# Patient Record
Sex: FEMALE | Race: WHITE
[De-identification: names, ages, dates, MRNs, and addresses within clinical notes are randomized per-mention and may not be internally consistent; named-entity substitution may affect disease eponyms.]

---

## 2017-01-04 LAB
ANION GAP SERPL CALC-SCNC: 6 MMOL/L (ref 3–11)
APPEARANCE UR: CLEAR
BASOPHILS # BLD: 0.05 K/UL (ref 0–0.2)
BASOPHILS NFR BLD: 0.7 %
BILIRUB UR-MCNC: (no result) MG/DL
BUN SERPL-MCNC: 14 MG/DL (ref 7–18)
BUN/CREAT SERPL: 20.1 (ref 10–20)
CALCIUM SERPL-MCNC: 9.4 MG/DL (ref 8.5–10.1)
CHLORIDE SERPL-SCNC: 108 MMOL/L (ref 98–107)
CO2 SERPL-SCNC: 29 MMOL/L (ref 21–32)
COLOR UR: (no result)
COMPLETE: YES
CREAT CL PREDICTED SERPL C-G-VRATE: 76.4 ML/MIN
CREAT SERPL-MCNC: 0.71 MG/DL (ref 0.6–1.2)
EOSINOPHIL NFR BLD AUTO: 353 K/UL (ref 130–400)
GLUCOSE SERPL-MCNC: 102 MG/DL (ref 70–99)
HCT VFR BLD CALC: 42.2 % (ref 37–47)
IG%: 0.3 %
IMM GRANULOCYTES NFR BLD AUTO: 32.9 %
LYMPHOCYTES # BLD: 2.36 K/UL (ref 1.2–3.4)
MANUAL MICROSCOPIC REQUIRED?: NO
MCH RBC QN AUTO: 29.9 PG (ref 25–34)
MCHC RBC AUTO-ENTMCNC: 33.9 G/DL (ref 32–36)
MCV RBC AUTO: 88.3 FL (ref 80–100)
MONOCYTES NFR BLD: 8.9 %
NEUTROPHILS # BLD AUTO: 7.3 %
NEUTROPHILS NFR BLD AUTO: 49.9 %
NITRITE UR QL STRIP: (no result)
PH UR STRIP: 6.5 [PH] (ref 4.5–7.5)
PMV BLD AUTO: 11.1 FL (ref 7.4–10.4)
POTASSIUM SERPL-SCNC: 4.1 MMOL/L (ref 3.5–5.1)
RBC # BLD AUTO: 4.78 M/UL (ref 4.2–5.4)
REVIEW REQ?: NO
SODIUM SERPL-SCNC: 143 MMOL/L (ref 136–145)
SP GR UR STRIP: 1.02 (ref 1–1.03)
URINE BILL WITH OR WITHOUT MIC: (no result)
URINE EPITHELIAL CELL AUTO: >30 /LPF (ref 0–5)
UROBILINOGEN UR-MCNC: (no result) MG/DL
WBC # BLD AUTO: 7.17 K/UL (ref 4.8–10.8)

## 2017-01-04 NOTE — PAT MEDICATION INSTRUCTIONS
Service Date


Jan 4, 2017.





Current Home Medication List


Ibuprofen (Advil), 600 MG PO prn





Medication Instructions


For Your Scheduled Surgery 





- Check with surgeon for instructions: 


Ibuprofen (Advil), 600 MG PO prn





If you have any questions please call us at [phone] (Dorothy Jules PA-C

) or [phone] or [phone]

## 2017-01-04 NOTE — DIAGNOSTIC IMAGING REPORT
CHEST PREADMISSION(PA/LAT)



HISTORY:      Preop.



COMPARISON: Chest 5/29/2014.



FINDINGS: The lungs are clear. Cardiac silhouette is normal in size. No pleural

effusions. No pneumothorax. Cervical spinal fusion hardware. Prior

cholecystectomy.



IMPRESSION:

No acute process.







Electronically signed by:  Ivan Jett M.D.

1/4/2017 10:49 AM

## 2017-01-10 ENCOUNTER — HOSPITAL ENCOUNTER (OUTPATIENT)
Dept: HOSPITAL 45 - C.ACU | Age: 62
Discharge: HOME | End: 2017-01-10
Attending: UROLOGY
Payer: COMMERCIAL

## 2017-01-10 VITALS
WEIGHT: 158.73 LBS | HEIGHT: 62.01 IN | HEIGHT: 62.01 IN | WEIGHT: 158.73 LBS | BODY MASS INDEX: 28.84 KG/M2 | BODY MASS INDEX: 28.84 KG/M2

## 2017-01-10 VITALS
TEMPERATURE: 97.7 F | OXYGEN SATURATION: 97 % | HEART RATE: 66 BPM | SYSTOLIC BLOOD PRESSURE: 110 MMHG | DIASTOLIC BLOOD PRESSURE: 74 MMHG

## 2017-01-10 VITALS
TEMPERATURE: 97.7 F | OXYGEN SATURATION: 97 % | DIASTOLIC BLOOD PRESSURE: 84 MMHG | HEART RATE: 66 BPM | SYSTOLIC BLOOD PRESSURE: 134 MMHG

## 2017-01-10 VITALS
DIASTOLIC BLOOD PRESSURE: 82 MMHG | SYSTOLIC BLOOD PRESSURE: 136 MMHG | OXYGEN SATURATION: 96 % | TEMPERATURE: 97.52 F | HEART RATE: 73 BPM

## 2017-01-10 VITALS
DIASTOLIC BLOOD PRESSURE: 84 MMHG | SYSTOLIC BLOOD PRESSURE: 125 MMHG | OXYGEN SATURATION: 97 % | HEART RATE: 69 BPM | TEMPERATURE: 97.7 F

## 2017-01-10 DIAGNOSIS — N39.3: Primary | ICD-10-CM

## 2017-01-10 DIAGNOSIS — K85.90: ICD-10-CM

## 2017-01-10 NOTE — [facility] POST OPERATIVE BRIEF NOTE
Immediate Operative Summary


Operative Date


Uday 10, 2017.





Pre-Operative Diagnosis





Stress Incontinence





Post-Operative Diagnosis





Same as preoperative





Procedure(s) Performed





Retropubic Mid-Urethral Sling ([employer] - Advantage Fit - Ref #


[id number];


Lot # [id number]), Cystoscopy





Surgeon


Dr. Martin Milian





Assistant Surgeon(s)


None per surgeon





Estimated Blood Loss


25ml





Findings


As per dictation





Specimens





None per surgeon





Drains


Gonzales (to be removed in PACU)





Anesthesia


Gen





Complication(s)


None





Disposition


Recovery Room / PACU (stable)

## 2017-01-10 NOTE — DISCHARGE INSTRUCTIONS
Discharge Instructions


Admission


Reason for Admission:  Urge & Stress Incontinence





Discharge


Discharge Diagnosis / Problem:  incontinence





Discharge Goals


Goal(s):  Decrease discomfort, Improve function, Increase independence, Improve 

disease control





Activity Recommendations


Activity Limitations:  per Instructions/Follow-up section


Lifting Limitations:  no more than 25 pounds (for the next 6 weeks)


Exercise/Sports Limitations:  until after follow-up appointment (Please avoid 

strenuous excercise, weight lifting, running, etc for the next 6 weeks)


May Resume Sexual Activity:  after follow-up appointment (nothing per vagina 

for 6 weeks)


Shower/Bathe:  tomorrow


Driving or Machine Use:  you may drive when you are off of pain medications and 

you feel comfortable controlling your vehicle





.





Instructions / Follow-Up


Instructions / Follow-Up


Please keep your previously scheduled follow up appointment with Dr. Milian





Discharge Diet


Recommended Diet:  Regular Diet





Procedures


Procedures Performed:  


Mid-Urethral Sling, Cystoscopy





Pending Studies


Studies pending at discharge:  no





Medical Emergencies








.


Who to Call and When:





Medical Emergencies:  If at any time you feel your situation is an emergency, 

please call 911 immediately.





.





Non-Emergent Contact


Non-Emergency issues call your:  Urologist


Call Non-Emergent contact if:  you have a fever, temperature is above 101.5, 

your pain is not controlled, your pain is worsening, wound has increased 

drainage





.


.





"Provider Documentation" section prepared by Cabrera Dow.





VTE Core Measure


Inpt VTE Proph given/why not?:  Treatment not indicated





[place] Drug Monitoring Program


Search Results:  patient reviewed within database, no issues identified

## 2017-01-10 NOTE — OPERATIVE REPORT
DATE OF OPERATION:  01/10/2017

 

PREOPERATIVE DIAGNOSIS:  Stress urinary incontinence.

 

POSTOPERATIVE DIAGNOSIS:  Stress urinary incontinence.

 

PROCEDURE PERFORMED:  [employer] Advantage Fit retropubic mid urethral

sling and cystoscopy.

 

ANESTHESIA:  General.

 

ESTIMATED BLOOD LOSS:  25 mL.

 

URINE OUTPUT:  Not recorded.

 

SPECIMENS:  None.

 

DRAINS:  She has a Gonzales catheter which will be removed in the PACU.

 

DESCRIPTION OF THE PROCEDURE:  Aileen Markley was identified in the

preoperative holding area.  Appropriate informed consents were reviewed and

completed and the patient was transported to the operating suite.  She

received Cipro, general anesthesia and was placed in dorsal lithotomy

position where she was sterilely prepped and draped.  I began the case by

passing a 16 Brazilian Gonzales catheter and draining the bladder completely.  This

catheter was then clamped and fixed up on the upper abdomen utilizing an

extra hemostat.  I then placed 2 retraction stitches to open the labia majora

to allow full inspection of the vaginal vault.  She has no significant

cystocele or anterior prolapse.  With the Ognzales balloon on gentle traction, I

was easily able to demarcate the bladder neck and visualizing the urethral

meatus.  I placed an Allis clamp approximately 1 cm inside the urethral

meatus and another just inside the bladder neck.  I used these to help bring

the tissue forward and I performed a hydrodissection using 1% lidocaine with

epinephrine.  I dissected in the midline and I dissected in the planned

direction of my dissection on the left and right into the corners of the

vault.  I then made a midline incision of approximately a centimeter and a

half.   This was through the full vaginal wall thickness with care to avoid

deep intrusion and encroachment upon the urethra.  I then utilized the

Metzenbaum scissors to dissect through this incision and laterally under the

mucosa and vaginal wall until I felt the pubic arch on both the left and the

right.  After feeling an adequate window to the bone, I withdrew the

Metzenbaum scissors.  I marked an incision on the suprapubic area

approximately 1 fingerbreadth lateral to the midline just above the top of

the pubic symphysis.  These incisions were marked and a gentle incision was

made through the skin only, not through the full dermis.  I then turned my

attention back to the vaginal incision and I began to gently guide the [employer] sling and introducing trocar into the incision first on the

patient's right.  I gently guided this through the opening with care to

double check to ensure I did not button hole into the corner of the vault,

and upon feeling the bone I curved the trocar underneath the bone and

directed it towards my previous incision.  After this was passed through, I

left the sling in place and I withdrew the introducing trocar.  I then

performed the same procedure on the patient's left.  I guided this through

the vaginal incision and under the bone and around to the top incision.  I

ensured that there was no buttonholing in the corner of the vault and left

the sling in place.  I then removed the Gonzales catheter again and performed a

cystoscopy.  I utilized a 70 degree lens to inspect all anterior and lateral

aspects of the bladder and I saw no evidence of any placement through the

bladder or injury to the bladder in the midst of this.  After confirming

appropriate placement, I withdrew the cystoscope.  I then tensioned the sling

utilizing Metzenbaum scissors between the sling material and the urethra

itself to avoid over-tensioning it.  After feeling that I was in appropriate

position I incised the plastic holding tab in the midline and removed the

plastic sheathing leaving the mesh sling in place.  I fine-tuned slightly,

again feeling comfortable that I could pass Metzenbaum scissors between the

back of the sling material and the urethra.  After confirming excellent

hemostasis, I closed the vaginal incision utilizing a running 2-0 Vicryl

stitch, followed by trimming the mesh at the superior incisions below the

skin edge pulling the skin up over them, and then closing the skin with

Dermabond.  I did place a Premarin soaked vaginal packing and I left the

Gonzales catheter in place to go to the PACU.  Both of these will be removed

before the patient goes home.  The patient tolerated this procedure very

well.  Of note, the Advantage Fit sling is reference number [id number], lot

number [id number].

 

 

I attest to the content of the Intraoperative Record and any orders documented therein. Any exceptio
ns are noted below.

## 2017-01-10 NOTE — ANESTHESIOLOGY PROGRESS NOTE
Anesthesia Post Op Note


Date & Time


Uday 10, 2017 at 16:42





Vital Signs


Pain Intensity:  2





 Vital Signs Past 12 Hours








  Date Time  Temp Pulse Resp B/P Pulse Ox O2 Delivery O2 Flow Rate FiO2


 


1/10/17 12:59 36.4 73 18 136/82 96 Room Air  


 


1/10/17 12:30 36.5 69 18 125/84 97 Room Air  


 


1/10/17 12:00 36.5 66 16 134/84 97 Room Air  


 


1/10/17 11:45 36 69 16 131/86 100 Room Air  


 


1/10/17 11:35  68 14 120/85 100 Nasal Cannula 2 


 


1/10/17 11:25  83 14 130/86 100 Nasal Cannula 2 


 


1/10/17 11:15 36 76 16 110/78 98 Nasal Cannula 2 


 


1/10/17 09:10 36.5 66 18 110/74 97 Room Air  











Notes


Mental Status:  alert / awake / arousable, participated in evaluation


Pt Amnestic to Procedure:  Yes


Nausea / Vomiting:  adequately controlled


Pain:  adequately controlled


Airway Patency, RR, SpO2:  stable & adequate


BP & HR:  stable & adequate


Hydration State:  stable & adequate


Anesthetic Complications:  no major complications apparent

## 2018-07-20 ENCOUNTER — HOSPITAL ENCOUNTER (OUTPATIENT)
Dept: HOSPITAL 45 - C.LAB1850 | Age: 63
Discharge: HOME | End: 2018-07-20
Attending: INTERNAL MEDICINE
Payer: COMMERCIAL

## 2018-07-20 DIAGNOSIS — Z00.00: Primary | ICD-10-CM

## 2018-07-20 DIAGNOSIS — Z11.59: ICD-10-CM

## 2018-07-20 DIAGNOSIS — Z13.29: ICD-10-CM

## 2018-07-20 DIAGNOSIS — Z13.220: ICD-10-CM

## 2018-07-20 LAB
ALBUMIN SERPL-MCNC: 3.5 GM/DL (ref 3.4–5)
ALP SERPL-CCNC: 75 U/L (ref 45–117)
ALT SERPL-CCNC: 35 U/L (ref 12–78)
AST SERPL-CCNC: 29 U/L (ref 15–37)
BASOPHILS # BLD: 0.05 K/UL (ref 0–0.2)
BASOPHILS NFR BLD: 0.4 %
BUN SERPL-MCNC: 10 MG/DL (ref 7–18)
CALCIUM SERPL-MCNC: 8.6 MG/DL (ref 8.5–10.1)
CO2 SERPL-SCNC: 27 MMOL/L (ref 21–32)
CREAT SERPL-MCNC: 0.72 MG/DL (ref 0.6–1.2)
EOS ABS #: 0.62 K/UL (ref 0–0.5)
EOSINOPHIL NFR BLD AUTO: 306 K/UL (ref 130–400)
GLUCOSE SERPL-MCNC: 85 MG/DL (ref 70–99)
HCT VFR BLD CALC: 41.4 % (ref 37–47)
HGB BLD-MCNC: 14 G/DL (ref 12–16)
IG#: 0.03 K/UL (ref 0–0.02)
IMM GRANULOCYTES NFR BLD AUTO: 26 %
KETONES UR QL STRIP: 87 MG/DL
LYMPHOCYTES # BLD: 2.99 K/UL (ref 1.2–3.4)
MCH RBC QN AUTO: 30 PG (ref 25–34)
MCHC RBC AUTO-ENTMCNC: 33.8 G/DL (ref 32–36)
MCV RBC AUTO: 88.7 FL (ref 80–100)
MONO ABS #: 0.95 K/UL (ref 0.11–0.59)
MONOCYTES NFR BLD: 8.2 %
NEUT ABS #: 6.88 K/UL (ref 1.4–6.5)
NEUTROPHILS # BLD AUTO: 5.4 %
NEUTROPHILS NFR BLD AUTO: 59.7 %
PH UR: 173 MG/DL (ref 0–200)
PMV BLD AUTO: 11.7 FL (ref 7.4–10.4)
POTASSIUM SERPL-SCNC: 3.7 MMOL/L (ref 3.5–5.1)
PROT SERPL-MCNC: 7.4 GM/DL (ref 6.4–8.2)
RED CELL DISTRIBUTION WIDTH CV: 13.7 % (ref 11.5–14.5)
RED CELL DISTRIBUTION WIDTH SD: 44.4 FL (ref 36.4–46.3)
SODIUM SERPL-SCNC: 138 MMOL/L (ref 136–145)
WBC # BLD AUTO: 11.52 K/UL (ref 4.8–10.8)